# Patient Record
Sex: FEMALE | Race: WHITE | NOT HISPANIC OR LATINO | Employment: FULL TIME | URBAN - METROPOLITAN AREA
[De-identification: names, ages, dates, MRNs, and addresses within clinical notes are randomized per-mention and may not be internally consistent; named-entity substitution may affect disease eponyms.]

---

## 2021-09-16 PROBLEM — M79.604 LOW BACK PAIN RADIATING TO RIGHT LEG: Status: ACTIVE | Noted: 2021-09-16

## 2021-09-16 PROBLEM — G96.191 TARLOV CYST: Status: ACTIVE | Noted: 2021-09-16

## 2021-09-16 PROBLEM — M54.50 LOW BACK PAIN RADIATING TO RIGHT LEG: Status: ACTIVE | Noted: 2021-09-16

## 2021-09-16 PROBLEM — M48.061 SPINAL STENOSIS OF LUMBAR REGION: Status: ACTIVE | Noted: 2021-09-16

## 2023-01-30 PROBLEM — E03.9 HYPOTHYROIDISM: Status: ACTIVE | Noted: 2023-01-30

## 2023-01-30 PROBLEM — E11.9 TYPE 2 DIABETES MELLITUS, WITHOUT LONG-TERM CURRENT USE OF INSULIN: Status: ACTIVE | Noted: 2023-01-30

## 2023-01-30 PROBLEM — I48.91 ATRIAL FIBRILLATION WITH RVR: Status: ACTIVE | Noted: 2023-01-30

## 2023-01-30 PROBLEM — I10 PRIMARY HYPERTENSION: Status: ACTIVE | Noted: 2023-01-30

## 2023-01-31 PROBLEM — E87.6 HYPOKALEMIA: Status: ACTIVE | Noted: 2023-01-31

## 2023-02-03 PROBLEM — E87.6 HYPOKALEMIA: Status: RESOLVED | Noted: 2023-01-31 | Resolved: 2023-02-03

## 2023-02-04 PROBLEM — F41.9 ANXIETY: Status: ACTIVE | Noted: 2023-02-04

## 2023-02-09 ENCOUNTER — TELEPHONE (OUTPATIENT)
Dept: ADMINISTRATIVE | Facility: CLINIC | Age: 73
End: 2023-02-09

## 2025-01-31 NOTE — TELEPHONE ENCOUNTER
1st Attempt- No answer or voicemail setup.  
Patient called about scheduling an appointment with cardiologist. Informed patient a referral has been placed and someone with contact her to setup an appointment. Also provided patient with 24 hour nurse line number. Patient verbalized understanding. No further needs at this time.  
#1: